# Patient Record
(demographics unavailable — no encounter records)

---

## 2022-07-28 LAB
INFLUENZA A: NOT DETECTED
INFLUENZA B: NOT DETECTED
SARS-COV-2: DETECTED

## 2022-10-17 NOTE — ED PROVIDER NOTES
General Medical Problem *ED        Patient:   Bhumi Urbina             MRN: [id number]            FIN: [id number]               Age:   15 years     Sex:  Female     :  2010   Associated Diagnoses:   COVID-19   Author:   Elba ANDERSEN      Basic Information   Time seen: Provider Seen ()   ED Provider/Time:    Elba ANDERSEN / 2022 03:10  . Additional information: Chief Complaint from Nursing Triage Note   Chief Complaint  Chief Complaint: pt bib mother c/o fever, body aches and vomiting. (22 03:16:00). History of Present Illness   The patient presents with 15year-old female with history of reactive airway disease. Patient presenting here for 1 day history of fever, headache, myalgias, rigors. Patient had multiple episodes of emesis tonight. Mother went to go check on her and she was having some rigors. She got a gram of Tylenol about 20 minutes prior to arrival.  No significant cough. Complains of some generalized abdominal pain but nothing focal.  No diarrhea. Patient has a friend who tested positive for COVID-19 several days ago and was exposed to this person. .        Review of Systems             Additional review of systems information: All systems reviewed as documented in chart. Health Status   Allergies: Allergic Reactions (Selected)  No Known Allergies. Past Medical/ Family/ Social History   Surgical history: Reviewed as documented in chart. Family history: Reviewed as documented in chart. Social history: Reviewed as documented in chart. Problem list:    No qualifying data available  . Physical Examination               Vital Signs   Vital Signs    1:16 EDT Systolic Blood Pressure 574 mmHg  HI    Diastolic Blood Pressure 71 mmHg    Temperature Oral 39.4 degC  HI    Heart Rate Monitored 136 bpm  >HHI    Respiratory Rate 25 br/min  HI    SpO2 99 %   . Measurements   2022 3:16 EDT       Weight Dosing             58 kg  . Basic Oxygen Information   2022 3:16 EDT Oxygen Therapy Room air    SpO2 99 %   . General:  Alert, no acute distress, Not ill-appearing,    Skin:  Warm, dry, intact. Head:  Normocephalic, atraumatic. Eye:  Extraocular movements are intact, normal conjunctiva. Cardiovascular:  Tachycardic, good peripheral perfusion, regular rhythm. Respiratory:  Lungs are clear to auscultation, respirations are non-labored. Gastrointestinal:  Soft, Nontender, Non distended. Neurological:  No focal neurological deficit observed, normal motor observed, normal speech observed. Psychiatric:  Cooperative, appropriate mood & affect. Medical Decision Making   Rationale:  15year-old female likely has a viral type illness. We will swab her for COVID-19 and influenza. Give some Motrin as well as Zofran. We will ensure that her vital signs stabilized some she is febrile and tachycardic. [facility] Reexamination/ Reevaluation   Time: 2022 04:08:00 . Notes: Patient does have COVID-19. Heart rate coming down after ibuprofen. She will be discharged home. .      Impression and Plan   Diagnosis   COVID-19 ([id number] U07.1, Discharge, Medical)   Plan   Condition: Stable. Disposition: Discharged: Time  2022 04:09:00, to home. Prescriptions: Launch Meds List (Selected)   Prescriptions  Prescribed  Zofran ODT 4 mg oral tablet, disintegratin mg, 1 tabs, Oral, TID, for 3 days, 9 tabs, 0 Refill(s). Patient was given the following educational materials:   COVID Discharge Instructions positive results (Custom). Follow up with: Follow up with primary care provider Within 1 to 2 days, Follow up with primary care provider Within 1 to 2 days. Counseled: Patient, Family, Regarding diagnosis, Regarding diagnostic results, Regarding treatment plan, Regarding prescription, Patient indicated understanding of instructions.     Signature Line     Electronically Signed on 2022 04:09 AM EDT ________________________________________________   Em ANDERSEN               Modified by: Em ANDERSEN on 07/28/2022 04:09 FREEDOM COBURN

## 2022-10-17 NOTE — ED NOTES
ED Patient Summary                 [facility]  [address], [place], [address] [place] [address]  [phone]  Discharge Instructions (Patient)  Dorian Dunn  :  2010                   MRN: [id number]                   FIN: [id number]  Reason For Visit: Body aches; Vomiting; Fever; FEVER/N/V  Final Diagnosis: [id number]     Visit Date: 2022 03:08:00  Address: [address] [place] [address]  Phone: [phone]     Emergency Department Providers:         Primary Physician:      Elijah Hernandez would like to thank you for allowing us to assist you with your healthcare needs. The following includes patient education materials and information regarding your injury/illness. Follow-up Instructions: You were seen today on an emergency basis. Please contact your primary care doctor for a follow up appointment. If you received a referral to a specialist doctor, it is important you follow-up as instructed. It is important that you call your follow-up doctor to schedule and confirm the location of your next appointment. Your doctor may practice at multiple locations. The office location of your follow-up appointment may be different to the one written on your discharge instructions. If you do not have a primary care doctor, please call [phone] for help in finding a [facility] Provider. For help in finding a specialist doctor, please call ..[phone]. If your condition gets worse before your follow-up with your primary care doctor or specialist, please return to the Emergency Department. Coronavirus 2019 (COVID-19) Reminders:     Patients age 15 - 24, with parental consent, and over age 25 can make an appointment for a COVID-19 vaccine. Patients can contact their [facility] doctors' offices to schedule an appointment to receive the COVID-19 vaccine.  Patients who do not have a Emma Bhagat physician can call [phone] to schedule vaccination appointments. Follow Up Appointments:  Primary Care Provider:     Name: Aron FELIPE     Phone: [phone]                 With: Address: When:   Follow up with primary care provider  Within 1 to 2 days              [address] SERVICES%>             New Medications  Printed Prescriptions  ondansetron (Zofran ODT 4 mg oral tablet, disintegrating) 1 Tabs Oral (given by mouth) 3 times a day for 3 Days. Refills: 0. Last Dose:____________________      Allergy Info: No Known Allergies     Discharge Additional Information          Discharge Patient 07/28/22 4:04:00 EDT      Patient Education Materials:                     COVID-19 Testing, Precautions, & Infection     x Your COVID-19 lab test result is POSITIVE. Your COVID-19 lab result is positive (detected) for the COVID-19 infection. Please follow all other discharge instructions given to you by your healthcare provider. Please follow the quarantine/isolation instructions on page 2.          **** The quickest way to view or print your own test result is by the Patient Portal at [email or web address] see below for further Patient Portal details. Note: In reviewing your COVID-19 result on the patient portal (under the results tab) you may notice a variation in the test name that includes, Coronavirus, cNoV, Corona, SARS, or COV2. These all relate to a COVID-19 test that you had performed at a [address] location.          CDC Quarantine/Isolation Guidelines  q If you were exposed to COVID-19 and are NOT UP-TO-DATE on COVID-19 vaccinations  · Stay home and quarantine until you receive your test result  If negative, you may return to normal activities  If your test result is positive, follow the instructions below  q If you were exposed to COVID-19 and are UP-TO-DATE on COVID-19 vaccinations  · You do not need to stay home unless you develop symptoms  · If you have symptoms, isolate at home until you receive your test result  If negative, you may return to normal activities  If your test result is positive, follow the instructions below  q If you were exposed to COVID-19 and had confirmed COVID-19 within the   past 90 days  · You do not need to stay home unless you develop symptoms  · If you have symptoms, isolate at home until you receive your test result  If negative, you may return to normal activities  If your test result is positive, follow the instructions below    q If your COVID-19 test is POSITIVE  · Stay home and isolate from others for at least 5 days  · You may end your isolation after 5 days if your symptoms are improving and you have gone at least 24 hours without a fever (without taking Tylenol, ibuprofen, or any other medications to reduce your fever)continue your isolation for a total of 10 days if your symptoms are not improving or you still have a fever after 5 days  · You should continue to wear a mask for 10 full days any time you are around others (inside or outside of your home)  · Avoid sharing confined spaces with others as much as possible. If you live in your home with other people, consider keeping a separate bedroom and bathroom just for your use    How to Access the [facility] Patient Portal  1. Go to: [email or web address]  2. You want the option of:  [facility]   If you do not have an account and are visiting the portal for the first time, select Sign up now. If you already have an account, select Log into [facility]. 3. If you are signing up for a new account you will fill out a Self-Enrollment for [facility] page in which you will securely enter your first, last name, date of birth, social security number, and an identity verification prompt.  *Tip:  when filling out the Self-Enrollment for [facility] enter your name using the same spelling that is on file with your physicians office or hospital. Once this page is filled out, hit the purple Next button at the bottom of the page. 4. Verify your information and click the purple Next Create Your Account button. 5. After identity verification, you will create your username and password for your [facility] Patient Portal. You will then click the green Create Account button. 6. Once your account has been created, you will be taken back to a login screen. Log in with the username and password that you created in step 5. If creating an account for a minor child, contact Medical Records at [phone] to receive a personal email invitation to enroll your child. Medical records is open M-F 8am-4:30pm. If you contact them after hours, select option 3 to leave a message and you will receive a call back the next business day. ! If you are having issues logging into or accessing your account, contact [employer] at [phone] available 7 days a week, 24 hours a day. CDC COVID-19 Resources  For more information, visit  [email or web address]                    (Scan these codes with an Apple or Android device)                       CDC Quarantine/Isolation                       What to do when                           COVID-19 Quarantine           Guidelines                                        when you are sick                                  VS Isolation                                                                                                                                                                                                                                                                                                                        What is COVID-19? COVID-19 stands for \"coronavirus disease 2019. \" It is caused by a virus called SARS-CoV-2.  The virus first appeared in late 2019 and quickly spread around the world.  People with COVID-19 can have fever, cough, trouble breathing, and other symptoms. Problems with breathing happen when the infection affects the lungs and causes pneumonia. Most people who get COVID-19 will not get severely ill. But some do. In many areas, people have been told to stay home and away from other people. This is to try to slow the spread of the virus. How is COVID-19 Spread? The virus that causes COVID-19 mainly spreads from person to person. This usually happens when an infected person coughs, sneezes, or talks near other people. The virus can be passed easily between people who live together. But it can also spread at gatherings where people are talking close together, shaking hands, hugging, sharing food, or even singing together. Doctors also think it is possible to get sick if you touch a surface that has the virus on it and then touch your mouth, nose, or eyes. A person can be infected, and spread the virus to others, even without having any symptoms. This is why keeping people apart is one of the best ways to slow the spread. It is also possible for the virus to spread from an infected person to an animal, like a pet. But this seems to be uncommon. There is no evidence that a person could get the virus from a pet. Experts do not think the virus is spread through food like some other viruses. There is also no evidence that it can be spread through the water in a pool or hot tub. But because the virus can spread when people are close together, things like swimming in a crowded pool are still risky. What are the Symptoms of COVID-19? Symptoms usually start 4 or 5 days after a person is infected with the virus. But in some people, it can take up to 2 weeks for symptoms to appear. Some people never show symptoms at all.   When symptoms do happen, they can include:  · Fever    Shaking chills  Problems with sense of smell   · Cough               Muscle aches           or taste  · Trouble Breathing  Headache  · Feeling tired    Sore throat  Some people have digestive problems like nausea or diarrhea. There have also been some reports of rashes or other skin symptoms. For example, some people with COVID-19 get reddish-purple spots on their fingers or toes. But it's not clear why or how often this happens. For most people, symptoms will get better within a few weeks. But in others, COVID-19 can lead to serious problems like pneumonia, not getting enough oxygen, heart problems, or even death. This risk gets higher as people get older. It is also higher in people who have other health problems like serious heart disease, chronic kidney disease, type 2 diabetes, chronic obstructive pulmonary disease (COPD), sickle cell disease, or obesity. People who have a weak immune system for other reasons (for example, HIV infection or certain medicines), asthma, cystic fibrosis, type 1 diabetes, or high blood pressure might also be at higher risk for serious problems. What should I do if I have symptoms? If you have a fever, cough, trouble breathing, or other symptoms of COVID-19, call your doctor or nurse. They will ask about your symptoms. They might also ask about any recent travel and whether you have been around anyone who might be sick. If your symptoms are not severe, it is best to call before you go in. The staff can tell you what to do and whether you need to be seen in person. Many people with only mild symptoms should stay home and avoid other people until they get better. If you do need to go to the clinic or hospital, cover your nose and mouth with cloth. This helps protect other people. The staff might also have you wait someplace away from other people. If you are severely ill and need to go to the clinic or hospital right away, you should still call ahead if possible. This way the staff can care for you while taking steps to protect others.  If you think you are having a medical

## 2022-10-17 NOTE — DISCHARGE SUMMARY
ED Clinical Summary                         [facility]  [address]  [place], [place], [address] [phone]           PERSON INFORMATION  Name: Bhumi Urbina Age:  15 Years : 2010   Sex: Female Language: English PCP: Nisa FELIPE   Marital Status:  Single Phone: [phone] Med Service: MED-Medicine   MRN:  [id number] Acct# [de-identified] Arrival: 2022 03:08:00   Visit Reason: Body aches; Vomiting; Fever; FEVER/N/V Acuity: 3 LOS: 000 01:04   Address:      [address] [place] [address]  Diagnosis:      COVID-19  Printed Prescriptions: Allergies      No Known Allergies      Medications Administered During Visit:                  Medication Dose Route   ondansetron 4 mg Oral   ibuprofen 600 mg Oral       Patient Medication List:              ondansetron (Zofran ODT 4 mg oral tablet, disintegrating) 1 Tabs Oral (given by mouth) 3 times a day for 3 Days. Refills: 0. Major Tests and Procedures: The following procedures and tests were performed during your ED visit. COMMONPROCEDURES%>  COMMON PROCEDURESCOMMENTS%>          Laboratory Orders  Name Status Details   [facility] Completed Nasopharyngeal Swab, Stat, ST - Stat, 22 3:20:00 EDT, 22 3:20:00 EDT, Nurse collect, Elba ANDERSEN, Print label Y/N               Radiology Orders  No radiology orders were placed. Patient Care Orders  Name Status Details   COVID-19 Status Ordered 22 3:20:19 EDT, NOT VALID FOR pharmacy, laboratory, radiology. , 22 3:20:19 EDT, COVID-19 Detected   Discharge Patient Ordered 22 4:04:00 EDT   ED Assessment Pediatric Ordered 22 3:18:16 EDT, 22 3:18:16 EDT   ED Secondary Triage Completed 22 3:18:16 EDT, 22 3:18:16 EDT   ED Triage Pediatric Completed 22 3:08:11 EDT, 22 3:08:11 EDT   Notify Provider Ordered 22 3:18:16 EDT, This message can only be seen by Nursing, it is not visible to Pharmacy, Laboratory, or Radiology. , 07/28/22 3:18:16 EDT   Notify Provider Ordered 07/28/22 3:20:19 EDT, This message can only be seen by Nursing, it is not visible to Pharmacy, Laboratory, or Radiology. , 07/28/22 3:20:19 EDT   Patient Isolation Ordered 07/28/22 3:20:00 EDT, Contact and Airborne, Constant Indicator             PROVIDER INFORMATION               Provider Role Assigned Inés ANDERSEN ED Provider 7/28/2022 03:10:35    Henry MEREDITH ED Nurse 7/28/2022 03:39:03        Attending Physician:  Mariama ANDERSEN     Admit Doc  Mariama ANDERSEN     Consulting Doc       VITALS INFORMATION  Vital Sign Triage Latest   Temp Oral ORAL_1%>39.4 degC ORAL%>37.6 degC   Temp Temporal TEMPORAL_1%> TEMPORAL%>   Temp Intravascular INTRAVASCULAR_1%> INTRAVASCULAR%>   Temp Axillary AXILLARY_1%> AXILLARY%>   Temp Rectal RECTAL_1%> RECTAL%>   02 Sat 99 % 99 %   Respiratory Rate RATE_1%>25 br/min RATE%>18 br/min   Peripheral Pulse Rate PULSE RATE_1%> PULSE RATE%>   Apical Heart Rate HEART RATE_1%> HEART RATE%>   Blood Pressure BLOOD PRESSURE_1%>/ BLOOD PRESSURE_1%>71 mmHg BLOOD PRESSURE%>125 mmHg / BLOOD PRESSURE%>71 mmHg                 Immunizations      No Immunizations Documented This Visit          DISCHARGE INFORMATION   Discharge Disposition: H Outpt-Sent Home   Discharge Location:    Home   Discharge Date and Time:    7/28/2022 04:12:13   ED Checkout Date and Time:    7/28/2022 04:12:13     DEPART REASON INCOMPLETE INFORMATION               Depart Action Incomplete Reason   Interactive View/I&O Recently assessed               Problems      No Problems Documented              Smoking Status      No Smoking Status Documented         PATIENT EDUCATION INFORMATION  Instructions:         COVID Discharge Instructions positive results (Custom)     Follow up:                    With: Address: When:   Follow up with primary care provider  Within 1 to 2 days kg  .   Basic Oxygen Information   2022 3:16 EDT Oxygen Therapy Room air    SpO2 99 %   . General:  Alert, no acute distress, Not ill-appearing,    Skin:  Warm, dry, intact. Head:  Normocephalic, atraumatic. Eye:  Extraocular movements are intact, normal conjunctiva. Cardiovascular:  Tachycardic, good peripheral perfusion, regular rhythm. Respiratory:  Lungs are clear to auscultation, respirations are non-labored. Gastrointestinal:  Soft, Nontender, Non distended. Neurological:  No focal neurological deficit observed, normal motor observed, normal speech observed. Psychiatric:  Cooperative, appropriate mood & affect. Medical Decision Making   Rationale:  15year-old female likely has a viral type illness. We will swab her for COVID-19 and influenza. Give some Motrin as well as Zofran. We will ensure that her vital signs stabilized some she is febrile and tachycardic. [facility] Reexamination/ Reevaluation   Time: 2022 04:08:00 . Notes: Patient does have COVID-19. Heart rate coming down after ibuprofen. She will be discharged home. .      Impression and Plan   Diagnosis   COVID-19 ([id number] U07.1, Discharge, Medical)   Plan   Condition: Stable. Disposition: Discharged: Time  2022 04:09:00, to home. Prescriptions: Launch Meds List (Selected)   Prescriptions  Prescribed  Zofran ODT 4 mg oral tablet, disintegratin mg, 1 tabs, Oral, TID, for 3 days, 9 tabs, 0 Refill(s). Patient was given the following educational materials:   COVID Discharge Instructions positive results (Custom). Follow up with: Follow up with primary care provider Within 1 to 2 days, Follow up with primary care provider Within 1 to 2 days. Counseled: Patient, Family, Regarding diagnosis, Regarding diagnostic results, Regarding treatment plan, Regarding prescription, Patient indicated understanding of instructions.

## 2022-10-17 NOTE — ED NOTES
ED Triage Note       ED Triage Pediatric Entered On:  7/28/2022 3:18 EDT    Performed On:  7/28/2022 3:16 EDT by Cecile Turcios RN, [address] [place] [address]               Triage Pediatric 8.6.19   Chief Complaint :   pt bib mother c/o fever, body aches and vomiting.     [place] [facility]) Mode of Arrival :   Private vehicle   Infectious Disease Documentation :   Document assessment   Minor Treatment Consent :   Obtained   Accompanied By :   Mother   Pregnancy Status :   Patient denies   Information Given By :   Self, Mother   Temperature Oral :   39.4 degC(Converted to: 102.9 degF)  (HI)    Heart Rate Monitored :   136 bpm (>HHI)    Respiratory Rate :   25 br/min (HI)    Systolic Blood Pressure :   127 mmHg (HI)    Diastolic Blood Pressure :   71 mmHg   SpO2 :   99 %   Oxygen Therapy :   Room air   Numeric Rating Pain Scale :   9   ED Peds Weight :   Document assessment   MARK Rowe Candice F - 7/28/2022 3:16 EDT   DCP GENERIC CODE   Tracking Acuity :   3   Tracking Group :   ED RSF Cinthia Webber RN, Childress F - 7/28/2022 3:16 EDT   ED General Section :   Document assessment   ED Allergies Section :   Document assessment   ED Reason for Visit Section :   Document assessment   MARK Rowe Candice F - 7/28/2022 3:16 EDT   ID Risk Screen Symptoms   Recent Travel History :   No recent travel   Last 90 days COVID-19 ID :   No   Close Contact with COVID-19 ID :   Yes   MARK Rowe Childress F - 7/28/2022 3:16 EDT   ID COVID-19 Screen   Fever OR Chills :   Yes   Headache :   Yes   New or Worsening Cough :   No   Fatigue :   Yes   Shortness of Breath ID :   No   Myalgia (Muscle Pain) :   Yes   Dyspnea :   No   Diarrhea :   No   Sore Throat :   No   Nausea :   Yes   Sudden Loss of Taste or Smell :   No   Vomiting :   Yes   Congestion or Runny Nose ID :   No   MARK Rowe Childress F - 7/28/2022 3:16 EDT   Allergies   (As Of: 7/28/2022 03:18:15 EDT)   Allergies (Active)   No Known Allergies  Estimated Onset Date:   Unspecified ; Created [facility]ewayne Edwards [facility]; Reaction Status:   Active ; Category:   Drug ; Substance:   No Known Allergies ; Type: Allergy ; Updated By:   Anna Weiss; Reviewed Date:   7/28/2022 3:17 EDT        Consent to Treat a Minor   Minor Treatment Consent Given By :   Mother   Method of Consent for Minor Treatment : In person   MARK Rowe Childress F - 7/28/2022 3:16 EDT   Psycho-Social   Last 3 mo, thoughts killing self/others :   NA - Pediatric pt OR unable to answer   Right click within box for Suspected Abuse policy link. :   None   Feels Safe Where Live :   Yes   ED Behavioral Activity Rating Scale :   3 - Drowsy, appears sedated   MARK Rowe Childress F - 7/28/2022 3:16 EDT   ED Reason for Visit   (As Of: 7/28/2022 03:18:15 EDT)   Diagnoses(Active)    Body aches  Date:   7/28/2022 ; Diagnosis Type:   Reason For Visit ; Confirmation:   Complaint of ; Clinical Dx: Body aches ; Classification:   Medical ; Clinical Service:   Emergency medicine ; Code:   PNED ; Probability:   0 ; Diagnosis Code:   [id number]      Fever  Date:   7/28/2022 ; Diagnosis Type:   Reason For Visit ; Confirmation:   Complaint of ; Clinical Dx:    Fever ; Classification:   Medical ; Clinical Service:   Emergency medicine ; Code:   PNED ; Probability:   0 ; Diagnosis Code:   [id number]      Vomiting  Date:   7/28/2022 ; Diagnosis Type:   Reason For Visit ; Confirmation:   Complaint of ; Clinical Dx:   Vomiting ; Classification:   Medical ; Clinical Service:   Emergency medicine ; Code:   PNED ; Probability:   0 ; Diagnosis Code:   [id number]        Peds Weight 8.6.19   Dosing Weight Obtained By :   Measured   Weight Dosing :   58 kg(Converted to: 127 lb 14 oz)    MARK Rowe Childress F - 7/28/2022 3:16 EDT

## 2022-10-17 NOTE — ED NOTES
ED Patient Education Note     Patient Education Materials Follows:                  COVID-19 Testing, Precautions, & Infection     x Your COVID-19 lab test result is POSITIVE. Your COVID-19 lab result is positive (detected) for the COVID-19 infection. Please follow all other discharge instructions given to you by your healthcare provider. Please follow the quarantine/isolation instructions on page 2.          **** The quickest way to view or print your own test result is by the Patient Portal at [email or web address] see below for further Patient Portal details. Note: In reviewing your COVID-19 result on the patient portal (under the results tab) you may notice a variation in the test name that includes, Coronavirus, cNoV, Corona, SARS, or COV2. These all relate to a COVID-19 test that you had performed at a [address] location.          CDC Quarantine/Isolation Guidelines  q If you were exposed to COVID-19 and are NOT UP-TO-DATE on COVID-19 vaccinations  · Stay home and quarantine until you receive your test result  If negative, you may return to normal activities  If your test result is positive, follow the instructions below  q If you were exposed to COVID-19 and are UP-TO-DATE on COVID-19 vaccinations  · You do not need to stay home unless you develop symptoms  · If you have symptoms, isolate at home until you receive your test result  If negative, you may return to normal activities  If your test result is positive, follow the instructions below  q If you were exposed to COVID-19 and had confirmed COVID-19 within the   past 90 days  · You do not need to stay home unless you develop symptoms  · If you have symptoms, isolate at home until you receive your test result  If negative, you may return to normal activities  If your test result is positive, follow the instructions below    q If your COVID-19 test is POSITIVE  · Stay home and isolate from others for at least 5 days  · You may end your isolation after 5 days if your symptoms are improving and you have gone at least 24 hours without a fever (without taking Tylenol, ibuprofen, or any other medications to reduce your fever)continue your isolation for a total of 10 days if your symptoms are not improving or you still have a fever after 5 days  · You should continue to wear a mask for 10 full days any time you are around others (inside or outside of your home)  · Avoid sharing confined spaces with others as much as possible. If you live in your home with other people, consider keeping a separate bedroom and bathroom just for your use    How to Access the [facility] Patient Portal  1. Go to: [email or web address]  2. You want the option of:  [facility]   If you do not have an account and are visiting the portal for the first time, select Sign up now. If you already have an account, select Log into [facility]. 3. If you are signing up for a new account you will fill out a Self-Enrollment for [facility] page in which you will securely enter your first, last name, date of birth, social security number, and an identity verification prompt. *Tip:  when filling out the Self-Enrollment for [facility] enter your name using the same spelling that is on file with your physicians office or hospital. Once this page is filled out, hit the purple Next button at the bottom of the page. 4. Verify your information and click the purple Next Create Your Account button. 5. After identity verification, you will create your username and password for your [facility] Patient Portal. You will then click the green Create Account button. 6. Once your account has been created, you will be taken back to a login screen. Log in with the username and password that you created in step 5.    If creating an account for a minor child, contact Medical Records at [phone] to receive a personal email invitation to enroll your child. Medical records is open M-F 8am-4:30pm. If you contact them after hours, select option 3 to leave a message and you will receive a call back the next business day. ! If you are having issues logging into or accessing your account, contact [employer] support at [phone] available 7 days a week, 24 hours a day. [facility] COVID-19 Resources  For more information, visit  [employer]                    (Scan these codes with an Apple or Android device)                       [facility] Quarantine/Isolation                       What to do when                           COVID-19 Quarantine           Guidelines                                        when you are sick                                  VS Isolation                                                                                                                                                                                                                                                                                                                        What is COVID-19? COVID-19 stands for \"coronavirus disease 2019. \" It is caused by a virus called SARS-CoV-2. The virus first appeared in late 2019 and quickly spread around the world. People with COVID-19 can have fever, cough, trouble breathing, and other symptoms. Problems with breathing happen when the infection affects the lungs and causes pneumonia. Most people who get COVID-19 will not get severely ill. But some do. In many areas, people have been told to stay home and away from other people. This is to try to slow the spread of the virus. How is COVID-19 Spread? The virus that causes COVID-19 mainly spreads from person to person. This usually happens when an infected person coughs, sneezes, or talks near other people. The virus can be passed easily between people who live together.  But it can also spread at gatherings where people are talking close together, shaking hands, hugging, sharing food, or even singing together. Doctors also think it is possible to get sick if you touch a surface that has the virus on it and then touch your mouth, nose, or eyes. A person can be infected, and spread the virus to others, even without having any symptoms. This is why keeping people apart is one of the best ways to slow the spread. It is also possible for the virus to spread from an infected person to an animal, like a pet. But this seems to be uncommon. There is no evidence that a person could get the virus from a pet. Experts do not think the virus is spread through food like some other viruses. There is also no evidence that it can be spread through the water in a pool or hot tub. But because the virus can spread when people are close together, things like swimming in a crowded pool are still risky. What are the Symptoms of COVID-19? Symptoms usually start 4 or 5 days after a person is infected with the virus. But in some people, it can take up to 2 weeks for symptoms to appear. Some people never show symptoms at all. When symptoms do happen, they can include:  · Fever    Shaking chills  Problems with sense of smell   · Cough               Muscle aches           or taste  · Trouble Breathing  Headache  · Feeling tired    Sore throat  Some people have digestive problems like nausea or diarrhea. There have also been some reports of rashes or other skin symptoms. For example, some people with COVID-19 get reddish-purple spots on their fingers or toes. But it's not clear why or how often this happens. For most people, symptoms will get better within a few weeks. But in others, COVID-19 can lead to serious problems like pneumonia, not getting enough oxygen, heart problems, or even death. This risk gets higher as people get older.  It is also higher in people who have other health problems like serious heart disease, chronic kidney disease, type 2 diabetes, chronic obstructive pulmonary disease (COPD), sickle cell disease, or obesity. People who have a weak immune system for other reasons (for example, HIV infection or certain medicines), asthma, cystic fibrosis, type 1 diabetes, or high blood pressure might also be at higher risk for serious problems. What should I do if I have symptoms? If you have a fever, cough, trouble breathing, or other symptoms of COVID-19, call your doctor or nurse. They will ask about your symptoms. They might also ask about any recent travel and whether you have been around anyone who might be sick. If your symptoms are not severe, it is best to call before you go in. The staff can tell you what to do and whether you need to be seen in person. Many people with only mild symptoms should stay home and avoid other people until they get better. If you do need to go to the clinic or hospital, cover your nose and mouth with cloth. This helps protect other people. The staff might also have you wait someplace away from other people. If you are severely ill and need to go to the clinic or hospital right away, you should still call ahead if possible. This way the staff can care for you while taking steps to protect others. If you think you are having a medical emergency, dial [id number].